# Patient Record
Sex: MALE | Race: WHITE | NOT HISPANIC OR LATINO | URBAN - NONMETROPOLITAN AREA
[De-identification: names, ages, dates, MRNs, and addresses within clinical notes are randomized per-mention and may not be internally consistent; named-entity substitution may affect disease eponyms.]

---

## 2021-06-11 ENCOUNTER — APPOINTMENT (OUTPATIENT)
Dept: URBAN - NONMETROPOLITAN AREA CLINIC 45 | Age: 36
Setting detail: DERMATOLOGY
End: 2021-06-13

## 2021-06-11 DIAGNOSIS — L738 OTHER SPECIFIED DISEASES OF HAIR AND HAIR FOLLICLES: ICD-10-CM

## 2021-06-11 DIAGNOSIS — L663 OTHER SPECIFIED DISEASES OF HAIR AND HAIR FOLLICLES: ICD-10-CM

## 2021-06-11 DIAGNOSIS — T07XXXA ABRASION OR FRICTION BURN OF OTHER, MULTIPLE, AND UNSPECIFIED SITES, WITHOUT MENTION OF INFECTION: ICD-10-CM

## 2021-06-11 DIAGNOSIS — L21.8 OTHER SEBORRHEIC DERMATITIS: ICD-10-CM

## 2021-06-11 DIAGNOSIS — L259 CONTACT DERMATITIS AND OTHER ECZEMA, UNSPECIFIED CAUSE: ICD-10-CM

## 2021-06-11 PROBLEM — L02.425 FURUNCLE OF RIGHT LOWER LIMB: Status: ACTIVE | Noted: 2021-06-11

## 2021-06-11 PROBLEM — S80.211A ABRASION, RIGHT KNEE, INITIAL ENCOUNTER: Status: ACTIVE | Noted: 2021-06-11

## 2021-06-11 PROBLEM — D23.71 OTHER BENIGN NEOPLASM OF SKIN OF RIGHT LOWER LIMB, INCLUDING HIP: Status: ACTIVE | Noted: 2021-06-11

## 2021-06-11 PROBLEM — L02.426 FURUNCLE OF LEFT LOWER LIMB: Status: ACTIVE | Noted: 2021-06-11

## 2021-06-11 PROBLEM — L30.8 OTHER SPECIFIED DERMATITIS: Status: ACTIVE | Noted: 2021-06-11

## 2021-06-11 PROCEDURE — OTHER PRESCRIPTION: OTHER

## 2021-06-11 PROCEDURE — OTHER TREATMENT REGIMEN: OTHER

## 2021-06-11 PROCEDURE — 99203 OFFICE O/P NEW LOW 30 MIN: CPT

## 2021-06-11 PROCEDURE — OTHER COUNSELING: OTHER

## 2021-06-11 RX ORDER — MUPIROCIN 20 MG/G
APPLY OINTMENT TOPICAL
Qty: 1 | Refills: 2 | Status: ERX | COMMUNITY
Start: 2021-06-11

## 2021-06-11 RX ORDER — HYDROCORTISONE 25 MG/G
APPLY CREAM TOPICAL AS DIRECTED
Qty: 1 | Refills: 3 | Status: ERX | COMMUNITY
Start: 2021-06-11

## 2021-06-11 RX ORDER — BETAMETHASONE DIPROPIONATE 0.5 MG/ML
APPLY LOTION TOPICAL AS DIRECTED
Qty: 1 | Refills: 2 | Status: ERX | COMMUNITY
Start: 2021-06-11

## 2021-06-11 RX ORDER — CICLOPIROX 10 MG/.96ML
APPLY SHAMPOO TOPICAL AS DIRECTED
Qty: 1 | Refills: 5 | Status: ERX | COMMUNITY
Start: 2021-06-11

## 2021-06-11 ASSESSMENT — LOCATION DETAILED DESCRIPTION DERM
LOCATION DETAILED: RIGHT ANTERIOR PROXIMAL THIGH
LOCATION DETAILED: LEFT CAVUM CONCHA
LOCATION DETAILED: RIGHT MEDIAL EYEBROW
LOCATION DETAILED: LEFT CHIN
LOCATION DETAILED: LEFT RADIAL PALM
LOCATION DETAILED: RIGHT INFERIOR OCCIPITAL SCALP
LOCATION DETAILED: RIGHT CRUS OF HELIX
LOCATION DETAILED: LEFT ANTERIOR PROXIMAL THIGH
LOCATION DETAILED: RIGHT KNEE

## 2021-06-11 ASSESSMENT — LOCATION SIMPLE DESCRIPTION DERM
LOCATION SIMPLE: CHIN
LOCATION SIMPLE: LEFT THIGH
LOCATION SIMPLE: SCALP
LOCATION SIMPLE: RIGHT EYEBROW
LOCATION SIMPLE: LEFT EAR
LOCATION SIMPLE: RIGHT KNEE
LOCATION SIMPLE: RIGHT EAR
LOCATION SIMPLE: LEFT HAND
LOCATION SIMPLE: RIGHT THIGH

## 2021-06-11 ASSESSMENT — LOCATION ZONE DERM
LOCATION ZONE: FACE
LOCATION ZONE: EAR
LOCATION ZONE: HAND
LOCATION ZONE: LEG
LOCATION ZONE: SCALP

## 2021-06-11 NOTE — PROCEDURE: TREATMENT REGIMEN
Plan: Mupirocin ointment twice a day as needed
Otc Regimen: Can wash with Zinc( ZNP ) Bar once a day
Detail Level: Zone
Plan: Ciclopirox Shampoo —Lather in scalp  let sit for a few minutes then rinse out, 1-2 times a week \\nbetamethasone dipropionate 0.05 % lotion : Apply  a few drops to scalp and massage it all over the scalp every night for 4 weeks then only 1 week cycles\\nhydrocortisone 2.5 % topical cream: Apply once a day as needed for facial / ear scaling flares only (one week cycles)
Plan: OTC Benzoyl peroxide wash in the shower \\nCan add Clindamycin Solution if condition worsens or persists
Plan: Triamcinolone cream twice a day x 2 week cycles as needed for flares\\nAveeno Baby or Eucerin Lotion or Cetaphil Cream 2-3 times a day (especially after bathing )

## 2021-07-09 ENCOUNTER — APPOINTMENT (OUTPATIENT)
Dept: URBAN - NONMETROPOLITAN AREA CLINIC 45 | Age: 36
Setting detail: DERMATOLOGY
End: 2021-07-09

## 2021-08-28 ENCOUNTER — TELEPHONE ENCOUNTER (OUTPATIENT)
Dept: URBAN - METROPOLITAN AREA CLINIC 13 | Facility: CLINIC | Age: 36
End: 2021-08-28

## 2021-08-29 ENCOUNTER — TELEPHONE ENCOUNTER (OUTPATIENT)
Dept: URBAN - METROPOLITAN AREA CLINIC 13 | Facility: CLINIC | Age: 36
End: 2021-08-29